# Patient Record
(demographics unavailable — no encounter records)

---

## 2025-02-15 NOTE — HISTORY OF PRESENT ILLNESS
[Never] : never [TextBox_4] : 57-year-old female with history of "asthma" first told 4 months ago, presents for pulmonary evaluation.  The patient has recently been in the emergency room twice at [facility] treated with systemic steroids and Zithromax as well as albuterol and tiotropium.  She initially was RSV positive as per the patient and her  who was present throughout.  Presently she continues to have cough with occasional wheezing, FERNANDES and left sided chest pain with deep breathing .  She denies fever, chills, hemoptysis, night sweats or weight loss. [TextBox_29] : Denies snoring, daytime somnolence, apneic episodes, AM headaches

## 2025-02-15 NOTE — DISCUSSION/SUMMARY
[FreeTextEntry1] : 57-year-old female with postinfectious inflammatory complaints.  She was given a 2-week sample of Breztri to use along with albuterol as needed.  PFTs will be performed later.  Chest x-ray results performed at [facility] will be obtained.  She is to use NSAID as needed for left-sided chest pain which is clearly musculoskeletal.  She is to follow-up with her PMD as before.  Her  was present throughout.

## 2025-02-15 NOTE — DISCUSSION/SUMMARY
[FreeTextEntry1] : 57-year-old female with postinfectious inflammatory complaints.  She was given a 2-week sample of Breztri to use along with albuterol as needed.  PFTs will be performed later.  Chest x-ray results performed at [facility] will be obtained.  She is to use NSAID as needed for left-sided chest pain which is clearly musculoskeletal.  She is to follow-up with her PMD as before.  Her  was present throughout. Speaking Coherently

## 2025-02-15 NOTE — HISTORY OF PRESENT ILLNESS
[Never] : never [TextBox_4] : 57-year-old female with history of "asthma" first told 4 months ago, presents for pulmonary evaluation.  The patient has recently been in the emergency room twice at [facility] [place] treated with systemic steroids and Zithromax as well as albuterol and tiotropium.  She initially was RSV positive as per the patient and her  who was present throughout.  Presently she continues to have cough with occasional wheezing, FERNANDES and left sided chest pain with deep breathing .  She denies fever, chills, hemoptysis, night sweats or weight loss. [TextBox_29] : Denies snoring, daytime somnolence, apneic episodes, AM headaches

## 2025-02-15 NOTE — HISTORY OF PRESENT ILLNESS
[Never] : never [TextBox_4] : 57-year-old female with history of "asthma" first told 4 months ago, presents for pulmonary evaluation.  The patient has recently been in the emergency room twice at [facility] treated with systemic steroids and Zithromax as well as albuterol and tiotropium.  She initially was RSV positive as per the patient and her  who was present throughout.  Presently she continues to have cough with occasional wheezing, FERNANDES and left sided chest pain with deep breathing .  She denies fever, chills, hemoptysis, night sweats or weight loss. [TextBox_29] : Denies snoring, daytime somnolence, apneic episodes, AM headaches

## 2025-02-15 NOTE — HISTORY OF PRESENT ILLNESS
[Never] : never [TextBox_4] : 57-year-old female with history of "asthma" first told 4 months ago, presents for pulmonary evaluation.  The patient has recently been in the emergency room twice at [facility] treated with systemic steroids and Zithromax as well as albuterol and tiotropium.  She initially was RSV positive as per the patient and her  who was present throughout.  Presently she continues to have cough with occasional wheezing, FERNANDES and left sided chest pain with deep breathing .  She denies fever, chills, hemoptysis, night sweats or weight loss. [TextBox_29] : Denies snoring, daytime somnolence, apneic episodes, AM headaches

## 2025-02-15 NOTE — DISCUSSION/SUMMARY
[FreeTextEntry1] : 57-year-old female with postinfectious inflammatory complaints.  She was given a 2-week sample of Breztri to use along with albuterol as needed.  PFTs will be performed later.  Chest x-ray results performed at [facility] will be obtained.  She is to use NSAID as needed for left-sided chest pain which is clearly musculoskeletal.  She is to follow-up with her PMD as before.  Her  was present throughout.

## 2025-02-25 NOTE — HISTORY OF PRESENT ILLNESS
[Never] : never [TextBox_4] : 57-year-old female with history of "asthma" presents follow-up.  Patient feels "better" post 2 weeks of Breztri with occasional albuterol use primarily nebulized at night.  Presently she denies fever, chills, chest pain, hemoptysis, night sweats or weight loss. [TextBox_29] : Denies snoring, daytime somnolence, apneic episodes, AM headaches

## 2025-06-04 NOTE — HISTORY OF PRESENT ILLNESS
[Never] : never [TextBox_29] : Denies snoring, daytime somnolence, apneic episodes, AM headaches [TextBox_4] : 57-year-old female with history of hyperreactive airways disease, presents for follow-up.  The patient continues to complain of occasional productive cough with SOB, without chest pain, hemoptysis, night sweats or weight loss.  Overall she does claim to feel "better" on daily montelukast with occasional albuterol MDI use.  She also uses occasional OTC antihistamine.

## 2025-06-04 NOTE — REVIEW OF SYSTEMS
[Nasal Congestion] : nasal congestion [Postnasal Drip] : postnasal drip [Cough] : cough [Chest Tightness] : no chest tightness [Sputum] : sputum [Dyspnea] : dyspnea [SOB on Exertion] : no sob on exertion [Negative] : Endocrine

## 2025-06-04 NOTE — DISCUSSION/SUMMARY
[FreeTextEntry1] : 57-year-old female with history of hyperreactive airways disease with related complaints and rhinitis.  Patient is to use montelukast daily as before with occasional albuterol MDI.  Given persistent wheezing and symptoms Trelegy 200 was also added.  OTC antihistamine for occasional use was also recommended.  PFTs will be repeated in the future.  She is to follow-up with her PMD as before.  Her  was present throughout.